# Patient Record
Sex: MALE | Race: WHITE | Employment: OTHER | URBAN - METROPOLITAN AREA
[De-identification: names, ages, dates, MRNs, and addresses within clinical notes are randomized per-mention and may not be internally consistent; named-entity substitution may affect disease eponyms.]

---

## 2018-04-24 ENCOUNTER — TRANSFERRED RECORDS (OUTPATIENT)
Dept: HEALTH INFORMATION MANAGEMENT | Facility: CLINIC | Age: 63
End: 2018-04-24

## 2019-02-21 ENCOUNTER — TRANSFERRED RECORDS (OUTPATIENT)
Dept: HEALTH INFORMATION MANAGEMENT | Facility: CLINIC | Age: 64
End: 2019-02-21

## 2019-02-21 LAB
ALT SERPL-CCNC: 32 IU/L (ref 12–65)
AST SERPL-CCNC: 27 IU/L (ref 15–37)
CHOLEST SERPL-MCNC: 159 MG/DL (ref 0–199)
CREAT SERPL-MCNC: 1.16 MG/DL (ref 0.6–1.3)
GFR SERPL CREATININE-BSD FRML MDRD: >60 ML/MIN/1.73M2
GLUCOSE SERPL-MCNC: 101 MG/DL (ref 70–100)
HDLC SERPL-MCNC: 46 MG/DL (ref 40–59)
LDLC SERPL CALC-MCNC: 88 MG/DL
PHQ9 SCORE: 0
POTASSIUM SERPL-SCNC: 4.3 MMOL/L (ref 3.5–5.1)
TRIGL SERPL-MCNC: 127 MG/DL
TSH SERPL-ACNC: 2.7 UIU/ML (ref 0.36–3.74)

## 2019-04-01 ENCOUNTER — OFFICE VISIT (OUTPATIENT)
Dept: UROLOGY | Facility: CLINIC | Age: 64
End: 2019-04-01
Payer: COMMERCIAL

## 2019-04-01 VITALS
TEMPERATURE: 97.7 F | BODY MASS INDEX: 30.8 KG/M2 | HEART RATE: 63 BPM | SYSTOLIC BLOOD PRESSURE: 139 MMHG | WEIGHT: 196.21 LBS | HEIGHT: 67 IN | DIASTOLIC BLOOD PRESSURE: 81 MMHG | RESPIRATION RATE: 14 BRPM

## 2019-04-01 DIAGNOSIS — N30.00 ACUTE CYSTITIS WITHOUT HEMATURIA: Primary | ICD-10-CM

## 2019-04-01 LAB
ALBUMIN UR-MCNC: NEGATIVE MG/DL
APPEARANCE UR: CLEAR
BILIRUB UR QL STRIP: NEGATIVE
COLOR UR AUTO: YELLOW
GLUCOSE UR STRIP-MCNC: NEGATIVE MG/DL
HGB UR QL STRIP: NEGATIVE
KETONES UR STRIP-MCNC: NEGATIVE MG/DL
LEUKOCYTE ESTERASE UR QL STRIP: NEGATIVE
NITRATE UR QL: NEGATIVE
PH UR STRIP: 7 PH (ref 5–7)
RBC #/AREA URNS AUTO: NORMAL /HPF
SOURCE: NORMAL
SP GR UR STRIP: 1.01 (ref 1–1.03)
UROBILINOGEN UR STRIP-ACNC: 0.2 EU/DL (ref 0.2–1)
WBC #/AREA URNS AUTO: NORMAL /HPF

## 2019-04-01 PROCEDURE — 99203 OFFICE O/P NEW LOW 30 MIN: CPT | Mod: 25 | Performed by: UROLOGY

## 2019-04-01 PROCEDURE — 51798 US URINE CAPACITY MEASURE: CPT | Performed by: UROLOGY

## 2019-04-01 PROCEDURE — 87086 URINE CULTURE/COLONY COUNT: CPT | Performed by: UROLOGY

## 2019-04-01 PROCEDURE — 81001 URINALYSIS AUTO W/SCOPE: CPT | Performed by: UROLOGY

## 2019-04-01 RX ORDER — TAMSULOSIN HYDROCHLORIDE 0.4 MG/1
0.4 CAPSULE ORAL
COMMUNITY
Start: 2019-01-10 | End: 2019-04-01

## 2019-04-01 RX ORDER — ASPIRIN 81 MG/1
81 TABLET, CHEWABLE ORAL
COMMUNITY

## 2019-04-01 RX ORDER — TAMSULOSIN HYDROCHLORIDE 0.4 MG/1
0.4 CAPSULE ORAL DAILY
Qty: 90 CAPSULE | Refills: 3 | Status: SHIPPED | OUTPATIENT
Start: 2019-04-01

## 2019-04-01 RX ORDER — ATORVASTATIN CALCIUM 80 MG/1
80 TABLET, FILM COATED ORAL
COMMUNITY
Start: 2019-02-21

## 2019-04-01 ASSESSMENT — MIFFLIN-ST. JEOR: SCORE: 1633.63

## 2019-04-01 NOTE — NURSING NOTE
"Chief Complaint   Patient presents with     Elevated PSA       Initial /81 (BP Location: Right arm, Patient Position: Chair, Cuff Size: Adult Regular)   Pulse 63   Temp 97.7  F (36.5  C) (Tympanic)   Resp 14   Ht 1.702 m (5' 7\")   Wt 89 kg (196 lb 3.4 oz)   BMI 30.73 kg/m   Estimated body mass index is 30.73 kg/m  as calculated from the following:    Height as of this encounter: 1.702 m (5' 7\").    Weight as of this encounter: 89 kg (196 lb 3.4 oz).  Medications and allergies reviewed.    Дмитрий POOL, CAMILA    "

## 2019-04-01 NOTE — PROGRESS NOTES
Appointment source: New Patient  Patient name: Hever Bajwa  Urology Staff: Igor Marroquin MD    Seen at the request of CORA Torres MD    Subjective: This is a 64 year old year old male wit a history of benign prostate biopsy one year ago and a persistently elevated PSA.    Denies a family history of prostate cancer.    Currently taking tamsulosin for voiding symptoms. Notices immediate decline in voiding force if the medication is not taken.    Previously was noted to have a large capacity bladder during an evaluation for voiding disorder. It was at that time that the tamsulosin was started.    Review of systems: a comprehensive 10 point ROS was obtained and was otherwise negative except for that outlined above.      Problem list and histories reviewed & adjusted, as indicated.   Additional history: as documented     PSA 8.720 ng/mL 3/26/19    PSA 8.120 ng/mL 3/29/18    Objective:  Examination:    Healthy male  HEENT: anicteric sclera, normal extraocular movements  Respiratory: normal, non labored breathing  Musculoskeletal:Normal muscular movements  Skin normal temperature, no rash  Psychiatric: appropriate affect    Abdomen benign  No evidence of inguinal hernia  No evidence of inguinal adenopathy  Phallus without lesion.  Scrotal contents normal  Rectal examination normal  Prostate benign to palpation    Prostate biopsy 4/24/2018    A) PROSTATE, RIGHT, NEEDLE BIOPSY:  1. Prostatic tissue without evidence of neoplasm  2. No atypical foci, high grade prostatic intraepithelial neoplasia, or malignancy    B) PROSTATE, LEFT, NEEDLE BIOPSY:  1. Prostatic tissue without evidence of neoplasm  2. No atypical foci, high grade prostatic intraepithelial neoplasia, or malignancy    Assessment:  Clinically insignificant rise in PSA in the context of a benign prostate biopsy one year ago.    Ongoing voiding symptoms managed adequately with tamsulosin. Incomplete voiding possibly secondary to chronic incomplete voiding.      Plan:  Return in 3 months for follow up. Continue tamsulosin.

## 2019-04-01 NOTE — NURSING NOTE
Active order to obtain bladder scan? Yes   Name of ordering provider:  Dr. Marroquin  Bladder scan preformed post void Yes  Bladder scan revealed >258ML  Provider notified?  Yes    Дмитрий POOL CMA

## 2019-04-02 LAB
BACTERIA SPEC CULT: NO GROWTH
Lab: NORMAL
SPECIMEN SOURCE: NORMAL